# Patient Record
Sex: FEMALE | Race: ASIAN | NOT HISPANIC OR LATINO
[De-identification: names, ages, dates, MRNs, and addresses within clinical notes are randomized per-mention and may not be internally consistent; named-entity substitution may affect disease eponyms.]

---

## 2021-03-17 ENCOUNTER — APPOINTMENT (OUTPATIENT)
Dept: CARDIOLOGY | Facility: CLINIC | Age: 76
End: 2021-03-17
Payer: MEDICARE

## 2021-03-17 VITALS
BODY MASS INDEX: 21.71 KG/M2 | HEIGHT: 62 IN | SYSTOLIC BLOOD PRESSURE: 127 MMHG | DIASTOLIC BLOOD PRESSURE: 89 MMHG | HEART RATE: 95 BPM | RESPIRATION RATE: 12 BRPM | WEIGHT: 118 LBS

## 2021-03-17 DIAGNOSIS — I10 ESSENTIAL (PRIMARY) HYPERTENSION: ICD-10-CM

## 2021-03-17 DIAGNOSIS — E78.5 HYPERLIPIDEMIA, UNSPECIFIED: ICD-10-CM

## 2021-03-17 PROCEDURE — 93000 ELECTROCARDIOGRAM COMPLETE: CPT

## 2021-03-17 PROCEDURE — 99204 OFFICE O/P NEW MOD 45 MIN: CPT

## 2021-03-17 RX ORDER — ATORVASTATIN CALCIUM 10 MG/1
10 TABLET, FILM COATED ORAL
Refills: 0 | Status: ACTIVE | COMMUNITY
Start: 2021-03-17

## 2021-03-17 RX ORDER — OLMESARTAN MEDOXOMIL 20 MG/1
20 TABLET, FILM COATED ORAL
Refills: 0 | Status: ACTIVE | COMMUNITY
Start: 2021-03-17

## 2021-04-20 ENCOUNTER — APPOINTMENT (OUTPATIENT)
Dept: CARDIOLOGY | Facility: CLINIC | Age: 76
End: 2021-04-20
Payer: MEDICARE

## 2021-04-20 VITALS
HEART RATE: 84 BPM | WEIGHT: 120 LBS | OXYGEN SATURATION: 92 % | SYSTOLIC BLOOD PRESSURE: 136 MMHG | DIASTOLIC BLOOD PRESSURE: 82 MMHG | BODY MASS INDEX: 21.95 KG/M2 | RESPIRATION RATE: 17 BRPM | TEMPERATURE: 98.3 F

## 2021-04-20 DIAGNOSIS — R06.02 SHORTNESS OF BREATH: ICD-10-CM

## 2021-04-20 PROCEDURE — A9500: CPT

## 2021-04-20 PROCEDURE — 93015 CV STRESS TEST SUPVJ I&R: CPT

## 2021-04-20 PROCEDURE — 78452 HT MUSCLE IMAGE SPECT MULT: CPT

## 2021-04-20 PROCEDURE — 93306 TTE W/DOPPLER COMPLETE: CPT

## 2021-04-22 ENCOUNTER — APPOINTMENT (OUTPATIENT)
Dept: CARDIOLOGY | Facility: CLINIC | Age: 76
End: 2021-04-22
Payer: MEDICARE

## 2021-04-22 VITALS
WEIGHT: 120 LBS | TEMPERATURE: 98.6 F | RESPIRATION RATE: 17 BRPM | BODY MASS INDEX: 21.95 KG/M2 | SYSTOLIC BLOOD PRESSURE: 132 MMHG | DIASTOLIC BLOOD PRESSURE: 79 MMHG | OXYGEN SATURATION: 96 % | HEART RATE: 86 BPM

## 2021-04-22 PROCEDURE — 93000 ELECTROCARDIOGRAM COMPLETE: CPT

## 2021-04-22 PROCEDURE — 99214 OFFICE O/P EST MOD 30 MIN: CPT

## 2021-04-22 RX ORDER — NITROGLYCERIN 0.4 MG/1
0.4 TABLET SUBLINGUAL
Refills: 0 | Status: ACTIVE | COMMUNITY
Start: 2021-04-22

## 2021-04-22 NOTE — DISCUSSION/SUMMARY
[FreeTextEntry1] : 75 F HTN hyperlipidemia with CP, SOB and abnormal NST.\par REFER FOR CARDIAC CATHETERIZATION STAT.\par Continue medications for HTN.\par Continue statin.\par Continue ASA.

## 2021-04-22 NOTE — REASON FOR VISIT
[Follow-Up - Clinic] : a clinic follow-up of [Chest Pain] : chest pain [Dyspnea] : dyspnea [Hyperlipidemia] : hyperlipidemia [Hypertension] : hypertension [FreeTextEntry1] : 75 F HTN hyperlipidemia with CP and SOB. \par

## 2021-04-22 NOTE — HISTORY OF PRESENT ILLNESS
[FreeTextEntry1] :  \par 1. HTN: on medications, compliant with medications.\par 2. Hyperlipidemia: on statin.\par 3. CP: patient has noted worsening symptoms over the last few months. CP is midline, substernal, exertional, progressive and worsening over one month. Her symptoms occur daily and are relieved with rest. She also has severe exertional SOB that is relieved with rest. Her SOB limits her ET as well. \par \par The patient has noted worsening exertional CP and SOB. She had an echocardiogram that showed normal LV function. NST showed ischemia in the LAD territory.

## 2021-04-22 NOTE — PHYSICAL EXAM
[General Appearance - Well Developed] : well developed [Normal Appearance] : normal appearance [Well Groomed] : well groomed [General Appearance - Well Nourished] : well nourished [No Deformities] : no deformities [General Appearance - In No Acute Distress] : no acute distress [Normal Conjunctiva] : the conjunctiva exhibited no abnormalities [Eyelids - No Xanthelasma] : the eyelids demonstrated no xanthelasmas [Normal Oral Mucosa] : normal oral mucosa [No Oral Pallor] : no oral pallor [No Oral Cyanosis] : no oral cyanosis [Normal Jugular Venous A Waves Present] : normal jugular venous A waves present [Normal Jugular Venous V Waves Present] : normal jugular venous V waves present [No Jugular Venous Almodovar A Waves] : no jugular venous almodovar A waves [Respiration, Rhythm And Depth] : normal respiratory rhythm and effort [Exaggerated Use Of Accessory Muscles For Inspiration] : no accessory muscle use [Auscultation Breath Sounds / Voice Sounds] : lungs were clear to auscultation bilaterally [Heart Rate And Rhythm] : heart rate and rhythm were normal [Heart Sounds] : normal S1 and S2 [FreeTextEntry1] : 2/6 TEMITOPE ROCHA  [Abdomen Soft] : soft [Abdomen Tenderness] : non-tender [Abdomen Mass (___ Cm)] : no abdominal mass palpated [Nail Clubbing] : no clubbing of the fingernails [Cyanosis, Localized] : no localized cyanosis [Petechial Hemorrhages (___cm)] : no petechial hemorrhages [Skin Color & Pigmentation] : normal skin color and pigmentation [] : no rash [No Venous Stasis] : no venous stasis [Skin Lesions] : no skin lesions [No Skin Ulcers] : no skin ulcer [No Xanthoma] : no  xanthoma was observed [Oriented To Time, Place, And Person] : oriented to person, place, and time [Mood] : the mood was normal [Affect] : the affect was normal [No Anxiety] : not feeling anxious

## 2021-05-04 ENCOUNTER — APPOINTMENT (OUTPATIENT)
Dept: DISASTER EMERGENCY | Facility: CLINIC | Age: 76
End: 2021-05-04

## 2021-05-04 DIAGNOSIS — Z01.818 ENCOUNTER FOR OTHER PREPROCEDURAL EXAMINATION: ICD-10-CM

## 2021-05-05 LAB — SARS-COV-2 N GENE NPH QL NAA+PROBE: NOT DETECTED

## 2021-05-06 VITALS
HEART RATE: 88 BPM | OXYGEN SATURATION: 95 % | TEMPERATURE: 98 F | DIASTOLIC BLOOD PRESSURE: 62 MMHG | WEIGHT: 119.93 LBS | SYSTOLIC BLOOD PRESSURE: 118 MMHG | HEIGHT: 61.02 IN | RESPIRATION RATE: 16 BRPM

## 2021-05-06 RX ORDER — CHLORHEXIDINE GLUCONATE 213 G/1000ML
1 SOLUTION TOPICAL ONCE
Refills: 0 | Status: DISCONTINUED | OUTPATIENT
Start: 2021-05-07 | End: 2021-05-21

## 2021-05-06 NOTE — H&P ADULT - HISTORY OF PRESENT ILLNESS
skeleton     The history was taken with the help of Occitan language line [employer] (200865 June)     COVID swab negative on HIE on 5/4/21  Pharmacy:  Escort:  Cardiologist: dr. Mei      75 y o f with PMH of HTN, HLD presented to her cardiologist c/o CP and SOB.   Pt denies PND/orthopnea, LE edema, n/v, fever/chills, blood in the stool, dizziness, syncope, palpitations.   Pt underwent ECHO on 4/20/21 which showed small, mild defects in apical, distal anterior, distal inferior, distal lateral, distal septal walls that are partially reversible, suggestive of mid distal LAD ischemia, LVEF 70 %post stress.  ECHO done 4/20/21 indicated normal LV with EF of 65-70 %, mild diastolic dysfunction, grossly normal RV, mild TR, normal pulmonary pressures.  In light of pt's risk factors, CCS symptoms mentioned above and abnormal NST, pt is now referred to [facility] for recommended cardiac cath with possible intervention.  The history was taken with the help of Kazakh language line solutions (200865 June)     COVID swab negative on HIE on 5/4/21  Pharmacy:  Escort:  Cardiologist: dr. Mei      75 y o f with PMH of HTN, HLD presented to her cardiologist c/o CP and SOB.   Pt denies PND/orthopnea, LE edema, n/v, fever/chills, blood in the stool, dizziness, syncope, palpitations.   Pt underwent ECHO on 4/20/21 which showed small, mild defects in apical, distal anterior, distal inferior, distal lateral, distal septal walls that are partially reversible, suggestive of mid distal LAD ischemia, LVEF 70 %post stress.  ECHO done 4/20/21 indicated normal LV with EF of 65-70 %, mild diastolic dysfunction, grossly normal RV, mild TR, normal pulmonary pressures.  In light of pt's risk factors, CCS symptoms mentioned above and abnormal NST, pt is now referred to [facility] for recommended cardiac cath with possible intervention.  The history was taken with the help of Vietnamese language line solutions (200865 June)     COVID swab negative on HIE on 5/4/21  Pharmacy: [place] [facility], [id number] [phone]  Escort: family  Cardiologist: dr. Mei    75 y o female  with PMH of HTN, HLD, Rhematic Arthritis presented to her cardiologist c/o CP and SOB.   Per pt, pt she is now experiencing intermittent, exertional CP, 4-5/10, a/w SOB, lasting several minutes that is relieved with rest.  Pt underwent ECHO on 4/20/21 which showed small, mild defects in apical, distal anterior, distal inferior, distal lateral, distal septal walls that are partially reversible, suggestive of mid distal LAD ischemia, LVEF 70 %post stress.  ECHO done 4/20/21 indicated normal LV with EF of 65-70 %, mild diastolic dysfunction, grossly normal RV, mild TR, normal pulmonary pressures.  In light of pt's risk factors, CCS symptoms mentioned above and abnormal NST, pt is now referred to [facility] for recommended cardiac cath with possible intervention.  The history was taken with the help of Greek language line solutions (200865 June)     COVID swab negative on HIE on 5/4/21  Pharmacy: [place] [facility], [address] [phone]  Escort: family  Cardiologist: dr. Mei    75 y o female  with PMH of HTN, HLD, Rhematic Arthritis presented to her cardiologist c/o CP and SOB.   Per pt, pt she is now experiencing intermittent, exertional CP, 4-5/10, a/w SOB, lasting several minutes that is relieved with rest.  Pt underwent NST on 4/20/21 which showed small, mild defects in apical, distal anterior, distal inferior, distal lateral, distal septal walls that are partially reversible, suggestive of mid distal LAD ischemia, LVEF 70 %post stress.  ECHO done 4/20/21 indicated normal LV with EF of 65-70 %, mild diastolic dysfunction, grossly normal RV, mild TR, normal pulmonary pressures.  In light of pt's risk factors, CCS symptoms mentioned above and abnormal NST, pt is now referred to [facility] for recommended cardiac cath with possible intervention.

## 2021-05-06 NOTE — H&P ADULT - ATTENDING COMMENTS
Please see housestaff note for full details.  I have reviewed the case, examined the patient and agree with plan.    Patient had cardiac catheterization that showed non-obstructive CAD.  Continue ASA and statin.   Will f/u with me in clinic.

## 2021-05-06 NOTE — H&P ADULT - ASSESSMENT
ASA:                 Mallampati:    Of note:   75 y o female  with PMH of HTN, HLD, Rhematic Arthritis presented to her cardiologist c/o Class III Anginal symptoms and found with an abnormal ECHO on 4/20/21 which showed small, mild defects in apical, distal anterior, distal inferior, distal lateral, distal septal walls that are partially reversible, suggestive of mid distal LAD ischemia, LVEF 70 %post stress.  ECHO done 4/20/21 indicated normal LV with EF of 65-70 %, mild diastolic dysfunction, grossly normal RV, mild TR, normal pulmonary pressures.  Given pt's risk factors and CCS symptoms mentioned above and abnormal NST, pt is now referred to [facility] for recommended cardiac cath with possible intervention.     ASA:  III              Mallampati:  II  Risks & benefits of procedure and alternative therapy have been explained to the patient including but not limited to: allergic reaction, bleeding w/possible need for blood transfusion, infection, renal and vascular compromise, limb damage, arrhythmia, stroke, vessel dissection/perforation, Myocardial infarction, emergent CABG. Informed consent obtained and in chart.    Of note:  Pt loaded with ASA 325mg and Plavix 600mg daily, NS @ 75ml x 4 hours.   75 y o female  with PMH of HTN, HLD, Rhematic Arthritis presented to her cardiologist c/o Class III Anginal symptoms and found with an abnormal NST on 4/20/21 which showed small, mild defects in apical, distal anterior, distal inferior, distal lateral, distal septal walls that are partially reversible, suggestive of mid distal LAD ischemia, LVEF 70 %post stress.  ECHO done 4/20/21 indicated normal LV with EF of 65-70 %, mild diastolic dysfunction, grossly normal RV, mild TR, normal pulmonary pressures.  Given pt's risk factors and CCS symptoms mentioned above and abnormal NST, pt is now referred to [facility] for recommended cardiac cath with possible intervention.     ASA:  III              Mallampati:  II  Risks & benefits of procedure and alternative therapy have been explained to the patient including but not limited to: allergic reaction, bleeding w/possible need for blood transfusion, infection, renal and vascular compromise, limb damage, arrhythmia, stroke, vessel dissection/perforation, Myocardial infarction, emergent CABG. Informed consent obtained and in chart.    Of note:  Pt loaded with ASA 325mg and Plavix 600mg daily, NS @ 75ml x 4 hours.

## 2021-05-06 NOTE — H&P ADULT - NSHPLABSRESULTS_GEN_ALL_CORE
12.8   5.80  )-----------( 278      ( 07 May 2021 09:42 )             38.8       05-07    141  |  107  |  17  ----------------------------<  95  4.7   |  25  |  0.54    Ca    9.1      07 May 2021 09:42    TPro  6.7  /  Alb  3.7  /  TBili  0.3  /  DBili  x   /  AST  33  /  ALT  18  /  AlkPhos  61  05-07      PT/INR - ( 07 May 2021 09:42 )   PT: 12.2 sec;   INR: 1.02          PTT - ( 07 May 2021 09:42 )  PTT:29.4 sec    CARDIAC MARKERS ( 07 May 2021 09:42 )  x     / x     / 91 U/L / x     / 2.7 ng/mL      EKG: NSR @ 88bpm,  ST Elevations or T wave inversions

## 2021-05-06 NOTE — H&P ADULT - [facility] HE PE MLT D E PC
Pulmonary Progress Note        NAME: Sarah Urbina - ROOM: [address] [place] [address] - MRN: [id number] - Age: 62year old - : 9/15/1952        SUBJECTIVE: No events overnight, brown stool this AM    OBJECTIVE:   17  0600 17  0700 17  0800  input(s): TROPI      INR   Date/Time Value Ref Range Status   04/29/2017 08:23 AM 0.99 0.89-1.11 Final   Comment:     New lot of PT reagent started on February 28,2017. The new INR reference range is 0.89-1. 11.   08/26/2013 09:00 AM 0.9 0.8 - 1.3 Final   0 regular rate and rhythm

## 2021-05-07 ENCOUNTER — OUTPATIENT (OUTPATIENT)
Dept: OUTPATIENT SERVICES | Facility: HOSPITAL | Age: 76
LOS: 1 days | Discharge: HOME | End: 2021-05-07
Payer: MEDICARE

## 2021-05-07 ENCOUNTER — TRANSCRIPTION ENCOUNTER (OUTPATIENT)
Age: 76
End: 2021-05-07

## 2021-05-07 DIAGNOSIS — R09.89 OTHER SPECIFIED SYMPTOMS AND SIGNS INVOLVING THE CIRCULATORY AND RESPIRATORY SYSTEMS: ICD-10-CM

## 2021-05-07 LAB
A1C WITH ESTIMATED AVERAGE GLUCOSE RESULT: 5.5 % — SIGNIFICANT CHANGE UP (ref 4–5.6)
ALBUMIN SERPL ELPH-MCNC: 3.7 G/DL — SIGNIFICANT CHANGE UP (ref 3.3–5)
ALP SERPL-CCNC: 61 U/L — SIGNIFICANT CHANGE UP (ref 40–120)
ALT FLD-CCNC: 18 U/L — SIGNIFICANT CHANGE UP (ref 10–45)
ANION GAP SERPL CALC-SCNC: 9 MMOL/L — SIGNIFICANT CHANGE UP (ref 5–17)
APTT BLD: 29.4 SEC — SIGNIFICANT CHANGE UP (ref 27.5–35.5)
AST SERPL-CCNC: 33 U/L — SIGNIFICANT CHANGE UP (ref 10–40)
BASOPHILS # BLD AUTO: 0.03 K/UL — SIGNIFICANT CHANGE UP (ref 0–0.2)
BASOPHILS NFR BLD AUTO: 0.5 % — SIGNIFICANT CHANGE UP (ref 0–2)
BILIRUB SERPL-MCNC: 0.3 MG/DL — SIGNIFICANT CHANGE UP (ref 0.2–1.2)
BUN SERPL-MCNC: 17 MG/DL — SIGNIFICANT CHANGE UP (ref 7–23)
CALCIUM SERPL-MCNC: 9.1 MG/DL — SIGNIFICANT CHANGE UP (ref 8.4–10.5)
CHLORIDE SERPL-SCNC: 107 MMOL/L — SIGNIFICANT CHANGE UP (ref 96–108)
CHOLEST SERPL-MCNC: 116 MG/DL — SIGNIFICANT CHANGE UP
CK MB CFR SERPL CALC: 2.7 NG/ML — SIGNIFICANT CHANGE UP (ref 0–6.7)
CK SERPL-CCNC: 91 U/L — SIGNIFICANT CHANGE UP (ref 25–170)
CO2 SERPL-SCNC: 25 MMOL/L — SIGNIFICANT CHANGE UP (ref 22–31)
CREAT SERPL-MCNC: 0.54 MG/DL — SIGNIFICANT CHANGE UP (ref 0.5–1.3)
EOSINOPHIL # BLD AUTO: 0.12 K/UL — SIGNIFICANT CHANGE UP (ref 0–0.5)
EOSINOPHIL NFR BLD AUTO: 2.1 % — SIGNIFICANT CHANGE UP (ref 0–6)
ESTIMATED AVERAGE GLUCOSE: 111 MG/DL — SIGNIFICANT CHANGE UP (ref 68–114)
GLUCOSE SERPL-MCNC: 95 MG/DL — SIGNIFICANT CHANGE UP (ref 70–99)
HCT VFR BLD CALC: 38.8 % — SIGNIFICANT CHANGE UP (ref 34.5–45)
HDLC SERPL-MCNC: 38 MG/DL — LOW
HGB BLD-MCNC: 12.8 G/DL — SIGNIFICANT CHANGE UP (ref 11.5–15.5)
IMM GRANULOCYTES NFR BLD AUTO: 0.2 % — SIGNIFICANT CHANGE UP (ref 0–1.5)
INR BLD: 1.02 — SIGNIFICANT CHANGE UP (ref 0.88–1.16)
LIPID PNL WITH DIRECT LDL SERPL: 64 MG/DL — SIGNIFICANT CHANGE UP
LYMPHOCYTES # BLD AUTO: 1.17 K/UL — SIGNIFICANT CHANGE UP (ref 1–3.3)
LYMPHOCYTES # BLD AUTO: 20.2 % — SIGNIFICANT CHANGE UP (ref 13–44)
MCHC RBC-ENTMCNC: 33 GM/DL — SIGNIFICANT CHANGE UP (ref 32–36)
MCHC RBC-ENTMCNC: 33.9 PG — SIGNIFICANT CHANGE UP (ref 27–34)
MCV RBC AUTO: 102.6 FL — HIGH (ref 80–100)
MONOCYTES # BLD AUTO: 0.41 K/UL — SIGNIFICANT CHANGE UP (ref 0–0.9)
MONOCYTES NFR BLD AUTO: 7.1 % — SIGNIFICANT CHANGE UP (ref 2–14)
NEUTROPHILS # BLD AUTO: 4.06 K/UL — SIGNIFICANT CHANGE UP (ref 1.8–7.4)
NEUTROPHILS NFR BLD AUTO: 69.9 % — SIGNIFICANT CHANGE UP (ref 43–77)
NON HDL CHOLESTEROL: 78 MG/DL — SIGNIFICANT CHANGE UP
NRBC # BLD: 0 /100 WBCS — SIGNIFICANT CHANGE UP (ref 0–0)
PLATELET # BLD AUTO: 278 K/UL — SIGNIFICANT CHANGE UP (ref 150–400)
POTASSIUM SERPL-MCNC: 4.7 MMOL/L — SIGNIFICANT CHANGE UP (ref 3.5–5.3)
POTASSIUM SERPL-SCNC: 4.7 MMOL/L — SIGNIFICANT CHANGE UP (ref 3.5–5.3)
PROT SERPL-MCNC: 6.7 G/DL — SIGNIFICANT CHANGE UP (ref 6–8.3)
PROTHROM AB SERPL-ACNC: 12.2 SEC — SIGNIFICANT CHANGE UP (ref 10.6–13.6)
RBC # BLD: 3.78 M/UL — LOW (ref 3.8–5.2)
RBC # FLD: 13.7 % — SIGNIFICANT CHANGE UP (ref 10.3–14.5)
SODIUM SERPL-SCNC: 141 MMOL/L — SIGNIFICANT CHANGE UP (ref 135–145)
TRIGL SERPL-MCNC: 72 MG/DL — SIGNIFICANT CHANGE UP
WBC # BLD: 5.8 K/UL — SIGNIFICANT CHANGE UP (ref 3.8–10.5)
WBC # FLD AUTO: 5.8 K/UL — SIGNIFICANT CHANGE UP (ref 3.8–10.5)

## 2021-05-07 PROCEDURE — 99152 MOD SED SAME PHYS/QHP 5/>YRS: CPT

## 2021-05-07 PROCEDURE — 82550 ASSAY OF CK (CPK): CPT

## 2021-05-07 PROCEDURE — 80053 COMPREHEN METABOLIC PANEL: CPT

## 2021-05-07 PROCEDURE — 83036 HEMOGLOBIN GLYCOSYLATED A1C: CPT

## 2021-05-07 PROCEDURE — 85730 THROMBOPLASTIN TIME PARTIAL: CPT

## 2021-05-07 PROCEDURE — 85610 PROTHROMBIN TIME: CPT

## 2021-05-07 PROCEDURE — 93005 ELECTROCARDIOGRAM TRACING: CPT

## 2021-05-07 PROCEDURE — 99218: CPT

## 2021-05-07 PROCEDURE — 80061 LIPID PANEL: CPT

## 2021-05-07 PROCEDURE — 82553 CREATINE MB FRACTION: CPT

## 2021-05-07 PROCEDURE — 85025 COMPLETE CBC W/AUTO DIFF WBC: CPT

## 2021-05-07 PROCEDURE — C1887: CPT

## 2021-05-07 PROCEDURE — 93010 ELECTROCARDIOGRAM REPORT: CPT

## 2021-05-07 PROCEDURE — 93458 L HRT ARTERY/VENTRICLE ANGIO: CPT | Mod: 26

## 2021-05-07 PROCEDURE — C1769: CPT

## 2021-05-07 PROCEDURE — 93458 L HRT ARTERY/VENTRICLE ANGIO: CPT

## 2021-05-07 PROCEDURE — C1894: CPT

## 2021-05-07 RX ORDER — ATORVASTATIN CALCIUM 80 MG/1
1 TABLET, FILM COATED ORAL
Qty: 0 | Refills: 0 | DISCHARGE

## 2021-05-07 RX ORDER — MAGNESIUM CHLORIDE
0 CRYSTALS MISCELLANEOUS
Qty: 0 | Refills: 0 | DISCHARGE

## 2021-05-07 RX ORDER — ADALIMUMAB 40MG/0.8ML
0 KIT SUBCUTANEOUS
Qty: 0 | Refills: 0 | DISCHARGE

## 2021-05-07 RX ORDER — ICOSAPENT ETHYL 500 MG/1
2 CAPSULE, LIQUID FILLED ORAL
Qty: 0 | Refills: 0 | DISCHARGE

## 2021-05-07 RX ORDER — DEXLANSOPRAZOLE 30 MG/1
1 CAPSULE, DELAYED RELEASE ORAL
Qty: 0 | Refills: 0 | DISCHARGE

## 2021-05-07 RX ORDER — OLMESARTAN MEDOXOMIL 5 MG/1
1 TABLET, FILM COATED ORAL
Qty: 0 | Refills: 0 | DISCHARGE

## 2021-05-07 RX ORDER — CLOPIDOGREL BISULFATE 75 MG/1
600 TABLET, FILM COATED ORAL ONCE
Refills: 0 | Status: COMPLETED | OUTPATIENT
Start: 2021-05-07 | End: 2021-05-07

## 2021-05-07 RX ORDER — METHOTREXATE 2.5 MG/1
0 TABLET ORAL
Qty: 0 | Refills: 0 | DISCHARGE

## 2021-05-07 RX ORDER — SODIUM CHLORIDE 9 MG/ML
500 INJECTION INTRAMUSCULAR; INTRAVENOUS; SUBCUTANEOUS
Refills: 0 | Status: DISCONTINUED | OUTPATIENT
Start: 2021-05-07 | End: 2021-05-07

## 2021-05-07 RX ORDER — ASPIRIN/CALCIUM CARB/MAGNESIUM 324 MG
325 TABLET ORAL ONCE
Refills: 0 | Status: COMPLETED | OUTPATIENT
Start: 2021-05-07 | End: 2021-05-07

## 2021-05-07 RX ORDER — SODIUM CHLORIDE 9 MG/ML
500 INJECTION INTRAMUSCULAR; INTRAVENOUS; SUBCUTANEOUS
Refills: 0 | Status: DISCONTINUED | OUTPATIENT
Start: 2021-05-07 | End: 2021-05-21

## 2021-05-07 RX ORDER — ERYTHROMYCIN BASE 5 MG/GRAM
1 OINTMENT (GRAM) OPHTHALMIC (EYE)
Qty: 0 | Refills: 0 | DISCHARGE

## 2021-05-07 RX ORDER — RALOXIFENE HYDROCHLORIDE 60 MG/1
1 TABLET, COATED ORAL
Qty: 0 | Refills: 0 | DISCHARGE

## 2021-05-07 RX ORDER — ASPIRIN/CALCIUM CARB/MAGNESIUM 324 MG
1 TABLET ORAL
Qty: 0 | Refills: 0 | DISCHARGE

## 2021-05-07 RX ORDER — CELECOXIB 200 MG/1
1 CAPSULE ORAL
Qty: 0 | Refills: 0 | DISCHARGE

## 2021-05-07 RX ORDER — MONTELUKAST 4 MG/1
1 TABLET, CHEWABLE ORAL
Qty: 0 | Refills: 0 | DISCHARGE

## 2021-05-07 RX ORDER — ASCORBIC ACID 60 MG
1 TABLET,CHEWABLE ORAL
Qty: 0 | Refills: 0 | DISCHARGE

## 2021-05-07 RX ADMIN — SODIUM CHLORIDE 75 MILLILITER(S): 9 INJECTION INTRAMUSCULAR; INTRAVENOUS; SUBCUTANEOUS at 11:02

## 2021-05-07 RX ADMIN — Medication 325 MILLIGRAM(S): at 11:12

## 2021-05-07 RX ADMIN — CLOPIDOGREL BISULFATE 600 MILLIGRAM(S): 75 TABLET, FILM COATED ORAL at 11:12

## 2021-05-07 NOTE — PROGRESS NOTE ADULT - SUBJECTIVE AND OBJECTIVE BOX
Interventional Cardiology PA SDA Discharge Note    Patient without complaints. Ambulated and voided without difficulties    Afebrile, VSS    Ext:    		  		Right              Radial :  No  hematoma,   No  bleeding, dressing; C/D/I      Pulses:    intact RAD to baseline     A/P:  75 y o Mohawk speaking female with PMH of HTN, HLD, Rheumatoid Arthritis presented to her cardiologist c/o CP and SOB.   Per pt, pt she is now experiencing intermittent, exertional CP, 4-5/10, a/w SOB, lasting several minutes that is relieved with rest.  Pt underwent NST on 4/20/21 which showed small, mild defects in apical, distal anterior, distal inferior, distal lateral, distal septal walls that are partially reversible, suggestive of mid distal LAD ischemia, LVEF 70 % post stress.  ECHO done 4/20/21 indicated normal LV with EF of 65-70 %, mild diastolic dysfunction, grossly normal RV, mild TR, normal pulmonary pressures.  In light of pt's risk factors, CCS symptoms mentioned above and abnormal NST, pt presented for recommended cardiac cath with possible intervention. Patient s/p diagnostic cardiac cath 5/7/2021 revealing non obstructive CAD , pLAD <30%, LVEDP 12 mm Hg, LVEF 65%, no AS or MR.     1.	Stable for discharge as per attending Dr. Johnson after bed rest, pt voids, right wrist stable and 30 minutes of ambulation.  2.	Follow-up with PMD/Cardiologist Dr. Mei in 1-2 weeks  3.	Discharged forms signed and copies in chart

## 2021-05-07 NOTE — PROGRESS NOTE ADULT - SUBJECTIVE AND OBJECTIVE BOX
Interventional Cardiology Radial band Removal Note    s/p Heparin 			s/p Angiomax    Pt without complaints.  VSS.    Right Radial access site TR Hemoband in place, _No  hematoma, _Nobleed  Radial pulse: 2+    Hemostasis achieved with manual release of hemoband.    __No__  Vasovagal reaction.    Meds given: None    Right Radial access site  ___No__ hematoma, _No_____ bleed  Radial pulse: 2+     A/P:  s/p Dx Coronary Angiogram ([facility] one)  -	continue to monitor  -	-OOB as tolerated  -	Post Procedure Instructions given  -                   Call [phone] if any access site issues.

## 2021-05-19 DIAGNOSIS — R94.39 ABNORMAL RESULT OF OTHER CARDIOVASCULAR FUNCTION STUDY: ICD-10-CM

## 2021-05-19 DIAGNOSIS — I10 ESSENTIAL (PRIMARY) HYPERTENSION: ICD-10-CM

## 2021-05-19 DIAGNOSIS — E78.5 HYPERLIPIDEMIA, UNSPECIFIED: ICD-10-CM

## 2021-05-19 DIAGNOSIS — I20.9 ANGINA PECTORIS, UNSPECIFIED: ICD-10-CM

## 2021-06-10 ENCOUNTER — APPOINTMENT (OUTPATIENT)
Dept: CARDIOLOGY | Facility: CLINIC | Age: 76
End: 2021-06-10
Payer: MEDICARE

## 2021-06-10 VITALS
HEART RATE: 85 BPM | RESPIRATION RATE: 17 BRPM | DIASTOLIC BLOOD PRESSURE: 78 MMHG | OXYGEN SATURATION: 95 % | WEIGHT: 119 LBS | BODY MASS INDEX: 21.77 KG/M2 | SYSTOLIC BLOOD PRESSURE: 129 MMHG | TEMPERATURE: 98.2 F

## 2021-06-10 DIAGNOSIS — I25.10 ATHEROSCLEROTIC HEART DISEASE OF NATIVE CORONARY ARTERY W/OUT ANGINA PECTORIS: ICD-10-CM

## 2021-06-10 PROBLEM — I10 ESSENTIAL (PRIMARY) HYPERTENSION: Chronic | Status: ACTIVE | Noted: 2021-05-06

## 2021-06-10 PROBLEM — E78.5 HYPERLIPIDEMIA, UNSPECIFIED: Chronic | Status: ACTIVE | Noted: 2021-05-06

## 2021-06-10 PROCEDURE — 99214 OFFICE O/P EST MOD 30 MIN: CPT

## 2021-06-10 PROCEDURE — 93000 ELECTROCARDIOGRAM COMPLETE: CPT

## 2021-06-10 NOTE — DISCUSSION/SUMMARY
[FreeTextEntry1] : 76 F HTN hyperlipidemia CAD for f/u after cardiac catheterization.\par Cath report reviewed.\par Continue ASA and statin.\par Continue medications for HTN and hyperlipidemia.\par EKG for CAD.

## 2021-06-10 NOTE — REASON FOR VISIT
[Hyperlipidemia] : hyperlipidemia [Hypertension] : hypertension [Coronary Artery Disease] : coronary artery disease [FreeTextEntry1] : 76 F HTN hyperlipidemia for f/u after cardiac cath. \par

## 2021-06-10 NOTE — HISTORY OF PRESENT ILLNESS
[FreeTextEntry1] :  \par 1. HTN: on medications, no SE noted.\par 2. Hyperlipidemia: on statin. The lipid profile is followed by PMD.\par 3. CAD: the patient underwent cardiac catheterization due to an abnormal NST. It showed non-obstructive CAD.\par She is on ASA and her symptoms have improved.\par She received her COVID vaccine.\par \par The patient is compliant with her medications. \par

## 2021-07-08 ENCOUNTER — APPOINTMENT (OUTPATIENT)
Dept: CARDIOLOGY | Facility: CLINIC | Age: 76
End: 2021-07-08
Payer: MEDICARE

## 2021-07-08 ENCOUNTER — NON-APPOINTMENT (OUTPATIENT)
Age: 76
End: 2021-07-08

## 2021-07-08 VITALS
DIASTOLIC BLOOD PRESSURE: 78 MMHG | WEIGHT: 119 LBS | TEMPERATURE: 97.5 F | OXYGEN SATURATION: 95 % | SYSTOLIC BLOOD PRESSURE: 121 MMHG | BODY MASS INDEX: 21.77 KG/M2 | RESPIRATION RATE: 18 BRPM | HEART RATE: 90 BPM

## 2021-07-08 PROCEDURE — 99214 OFFICE O/P EST MOD 30 MIN: CPT

## 2021-07-08 PROCEDURE — 93000 ELECTROCARDIOGRAM COMPLETE: CPT

## 2021-07-08 NOTE — HISTORY OF PRESENT ILLNESS
[FreeTextEntry1] :  \par 1. HTN: on medications, compliant with medications.\par 2. Hyperlipidemia: on statin. No SE noted. \par 3. CAD: the patient underwent cardiac catheterization due to an abnormal NST. It showed non-obstructive CAD.\par She is on ASA and her symptoms have improved.\par She received her COVID vaccine.\par The patient reports improvement in her CP and SOB.\par ET is stable.

## 2021-07-08 NOTE — REASON FOR VISIT
[Hyperlipidemia] : hyperlipidemia [Hypertension] : hypertension [Coronary Artery Disease] : coronary artery disease [FreeTextEntry1] : 76 F HTN hyperlipidemia CAD for f/u.\par

## 2021-07-08 NOTE — DISCUSSION/SUMMARY
[FreeTextEntry1] : 76 F HTN hyperlipidemia CAD for f/u.\par EKG for CAD.\par Continue ASA for CAD.\par Continue medications for HTN.\par Continue statin for hyperlipidemia.\par Patient received her COVID vaccine.\par

## 2021-10-14 ENCOUNTER — APPOINTMENT (OUTPATIENT)
Dept: CARDIOLOGY | Facility: CLINIC | Age: 76
End: 2021-10-14
Payer: MEDICARE

## 2021-10-14 VITALS
WEIGHT: 113 LBS | RESPIRATION RATE: 18 BRPM | TEMPERATURE: 97.6 F | BODY MASS INDEX: 20.67 KG/M2 | DIASTOLIC BLOOD PRESSURE: 75 MMHG | HEART RATE: 83 BPM | OXYGEN SATURATION: 95 % | SYSTOLIC BLOOD PRESSURE: 125 MMHG

## 2021-10-14 PROCEDURE — 99214 OFFICE O/P EST MOD 30 MIN: CPT

## 2021-10-14 PROCEDURE — 93000 ELECTROCARDIOGRAM COMPLETE: CPT

## 2021-10-14 NOTE — REASON FOR VISIT
[Hyperlipidemia] : hyperlipidemia [Hypertension] : hypertension [FreeTextEntry1] : 76 F HTN hyperlipidemia CAD for f/u.\par \par

## 2021-10-14 NOTE — DISCUSSION/SUMMARY
[FreeTextEntry1] : 76 F HTN hyperlipidemia CAD for f/u.\par Continue ASA 81 for CAD.\par Continue medications for HTN and hyperlipidemia.\par Continue symptom monitoring.\par Patient received her COVID vaccine.

## 2021-10-14 NOTE — HISTORY OF PRESENT ILLNESS
[FreeTextEntry1] :  \par 1. HTN: on medications, controlled..\par 2. Hyperlipidemia: on statin. No muscle pain is noted.\par 3. CAD: the patient underwent cardiac catheterization due to an abnormal NST. It showed non-obstructive CAD.\par She is on ASA and her symptoms have improved.\par She received her COVID vaccine.\par \par The patient received her flu vaccine.\par \par The patient is compliant with medications.

## 2022-02-03 ENCOUNTER — APPOINTMENT (OUTPATIENT)
Dept: CARDIOLOGY | Facility: CLINIC | Age: 77
End: 2022-02-03
Payer: MEDICARE

## 2022-02-03 VITALS
SYSTOLIC BLOOD PRESSURE: 127 MMHG | RESPIRATION RATE: 18 BRPM | OXYGEN SATURATION: 94 % | TEMPERATURE: 97.6 F | DIASTOLIC BLOOD PRESSURE: 73 MMHG | WEIGHT: 112 LBS | BODY MASS INDEX: 20.49 KG/M2 | HEART RATE: 84 BPM

## 2022-02-03 PROCEDURE — 99214 OFFICE O/P EST MOD 30 MIN: CPT

## 2022-02-03 PROCEDURE — 93000 ELECTROCARDIOGRAM COMPLETE: CPT

## 2022-02-03 NOTE — DISCUSSION/SUMMARY
[FreeTextEntry1] : 76 F HTN hyperlipidemia CAD for f/u.\par Continue ASA 81 for CAD.\par Continue medications for HTN.\par Continue statin for hyperlipidemia.\par Patient received her COVID vaccine.\par Symptom monitoring and social distancing. \par

## 2022-02-03 NOTE — HISTORY OF PRESENT ILLNESS
[FreeTextEntry1] :  \par 1. HTN: on medications, compliant with medications.\par 2. Hyperlipidemia: on statin. No SE are noted.\par 3. CAD: the patient underwent cardiac catheterization due to an abnormal NST. \par It showed non-obstructive CAD.\par She is on ASA and her symptoms have improved.\par She received her COVID vaccine.\par \par The patient denies CP or SOB. No cough or fevers noted.\par ET is stable.\par Patient is compliant with medications.\par

## 2022-06-13 ENCOUNTER — APPOINTMENT (OUTPATIENT)
Dept: CARDIOLOGY | Facility: CLINIC | Age: 77
End: 2022-06-13
Payer: MEDICARE

## 2022-06-13 VITALS
TEMPERATURE: 97.6 F | WEIGHT: 111 LBS | HEART RATE: 68 BPM | OXYGEN SATURATION: 98 % | DIASTOLIC BLOOD PRESSURE: 77 MMHG | RESPIRATION RATE: 18 BRPM | BODY MASS INDEX: 20.3 KG/M2 | SYSTOLIC BLOOD PRESSURE: 134 MMHG

## 2022-06-13 PROCEDURE — 93000 ELECTROCARDIOGRAM COMPLETE: CPT

## 2022-06-13 PROCEDURE — 99214 OFFICE O/P EST MOD 30 MIN: CPT

## 2022-06-13 RX ORDER — CELECOXIB 200 MG/1
200 CAPSULE ORAL
Qty: 30 | Refills: 0 | Status: ACTIVE | COMMUNITY
Start: 2022-05-17

## 2022-06-13 RX ORDER — MONTELUKAST 10 MG/1
10 TABLET, FILM COATED ORAL
Qty: 30 | Refills: 0 | Status: ACTIVE | COMMUNITY
Start: 2022-05-17

## 2022-06-13 RX ORDER — ADALIMUMAB 40MG/0.4ML
40 KIT SUBCUTANEOUS
Qty: 2 | Refills: 0 | Status: ACTIVE | COMMUNITY
Start: 2022-05-16

## 2022-06-13 RX ORDER — OFLOXACIN 3 MG/ML
0.3 SOLUTION/ DROPS OPHTHALMIC
Qty: 5 | Refills: 0 | Status: ACTIVE | COMMUNITY
Start: 2022-03-28

## 2022-06-13 RX ORDER — ASPIRIN 81 MG/1
81 TABLET ORAL
Qty: 30 | Refills: 5 | Status: ACTIVE | COMMUNITY
Start: 2021-04-22 | End: 1900-01-01

## 2022-06-13 RX ORDER — ICOSAPENT ETHYL 1000 MG/1
1 CAPSULE ORAL
Qty: 120 | Refills: 0 | Status: ACTIVE | COMMUNITY
Start: 2022-04-15

## 2022-06-13 RX ORDER — BUDESONIDE AND FORMOTEROL FUMARATE DIHYDRATE 160; 4.5 UG/1; UG/1
160-4.5 AEROSOL RESPIRATORY (INHALATION)
Qty: 10 | Refills: 0 | Status: ACTIVE | COMMUNITY
Start: 2022-05-17

## 2022-06-13 RX ORDER — OMEPRAZOLE 40 MG/1
40 CAPSULE, DELAYED RELEASE ORAL
Qty: 30 | Refills: 0 | Status: ACTIVE | COMMUNITY
Start: 2022-05-17

## 2022-06-13 RX ORDER — CYCLOSPORINE 0.5 MG/ML
0.05 EMULSION OPHTHALMIC
Qty: 180 | Refills: 0 | Status: ACTIVE | COMMUNITY
Start: 2022-04-18

## 2022-06-13 NOTE — HISTORY OF PRESENT ILLNESS
[FreeTextEntry1] :  \par 1. HTN: on medications, controlled.\par 2. Hyperlipidemia: on statin. The lipid profile is followed by the PMD.\par 3. CAD: the patient underwent cardiac catheterization due to an abnormal NST. \par It showed non-obstructive CAD.\par She is on ASA 81 QD.\par She received her COVID vaccine.\par \par She denies any new symptoms.\par

## 2022-06-13 NOTE — REASON FOR VISIT
[Hyperlipidemia] : hyperlipidemia [Hypertension] : hypertension [Coronary Artery Disease] : coronary artery disease [FreeTextEntry1] : 77 F HTN hyperlipidemia CAD for f/u.\par 2021\par Dr. Lauro Chu\par

## 2022-06-13 NOTE — DISCUSSION/SUMMARY
[FreeTextEntry1] : 77 F HTN hyperlipidemia CAD for f/u.\par Continue medications for HTN and hyperlipidemia.\par Continue ASA 81 for CAD.\par Continue exercise and diet.\par